# Patient Record
Sex: MALE | Race: WHITE
[De-identification: names, ages, dates, MRNs, and addresses within clinical notes are randomized per-mention and may not be internally consistent; named-entity substitution may affect disease eponyms.]

---

## 2018-11-24 ENCOUNTER — HOSPITAL ENCOUNTER (EMERGENCY)
Dept: HOSPITAL 57 - BURERS | Age: 28
Discharge: ANOTHER HOSPITAL | End: 2018-11-24
Payer: SELF-PAY

## 2018-11-24 DIAGNOSIS — F17.210: ICD-10-CM

## 2018-11-24 DIAGNOSIS — K35.32: Primary | ICD-10-CM

## 2018-11-24 LAB
ALBUMIN SERPL BCG-MCNC: 4.2 G/DL (ref 3.5–5)
ALP SERPL-CCNC: 47 U/L (ref 40–150)
ALT SERPL W P-5'-P-CCNC: 47 U/L (ref 8–55)
ANION GAP SERPL CALC-SCNC: 13 MMOL/L (ref 10–20)
AST SERPL-CCNC: 22 U/L (ref 5–34)
BASOPHILS # BLD AUTO: 0.1 THOU/UL (ref 0–0.2)
BASOPHILS NFR BLD AUTO: 0.5 % (ref 0–1)
BILIRUB SERPL-MCNC: 0.6 MG/DL (ref 0.2–1.2)
BUN SERPL-MCNC: 8 MG/DL (ref 8.9–20.6)
CALCIUM SERPL-MCNC: 9.8 MG/DL (ref 7.8–10.44)
CHLORIDE SERPL-SCNC: 99 MMOL/L (ref 98–107)
CO2 SERPL-SCNC: 28 MMOL/L (ref 22–29)
CREAT CL PREDICTED SERPL C-G-VRATE: 0 ML/MIN (ref 70–130)
EOSINOPHIL # BLD AUTO: 0.1 THOU/UL (ref 0–0.7)
EOSINOPHIL NFR BLD AUTO: 0.7 % (ref 0–10)
GLOBULIN SER CALC-MCNC: 3.3 G/DL (ref 2.4–3.5)
GLUCOSE SERPL-MCNC: 84 MG/DL (ref 70–105)
HGB BLD-MCNC: 14.4 G/DL (ref 14–18)
LIPASE SERPL-CCNC: 9 U/L (ref 8–78)
LYMPHOCYTES # BLD AUTO: 2.5 THOU/UL (ref 1.2–3.4)
LYMPHOCYTES NFR BLD AUTO: 13.5 % (ref 21–51)
MANUAL DIFF??: NO
MCH RBC QN AUTO: 30.2 PG (ref 27–31)
MCV RBC AUTO: 84.4 FL (ref 78–98)
MDIFF COMPLETE?: YES
MONOCYTES # BLD AUTO: 1.5 THOU/UL (ref 0.11–0.59)
MONOCYTES NFR BLD AUTO: 8.3 % (ref 0–10)
NEUTROPHILS # BLD AUTO: 14 THOU/UL (ref 1.4–6.5)
NEUTROPHILS NFR BLD AUTO: 77 % (ref 42–75)
PLATELET # BLD AUTO: 223 THOU/UL (ref 130–400)
POTASSIUM SERPL-SCNC: 4 MMOL/L (ref 3.5–5.1)
RBC # BLD AUTO: 4.77 MILL/UL (ref 4.7–6.1)
RBC UR QL AUTO: (no result) HPF (ref 0–3)
SODIUM SERPL-SCNC: 136 MMOL/L (ref 136–145)
SP GR UR STRIP: 1.01 (ref 1–1.03)
UNIDENT CRYS #/AREA URNS HPF: (no result) HPF
WBC # BLD AUTO: 18.2 THOU/UL (ref 4.8–10.8)
WBC UR QL AUTO: (no result) HPF (ref 0–3)

## 2018-11-24 PROCEDURE — 81015 MICROSCOPIC EXAM OF URINE: CPT

## 2018-11-24 PROCEDURE — 80053 COMPREHEN METABOLIC PANEL: CPT

## 2018-11-24 PROCEDURE — 83690 ASSAY OF LIPASE: CPT

## 2018-11-24 PROCEDURE — 81003 URINALYSIS AUTO W/O SCOPE: CPT

## 2018-11-24 PROCEDURE — 85025 COMPLETE CBC W/AUTO DIFF WBC: CPT

## 2018-11-24 PROCEDURE — 36415 COLL VENOUS BLD VENIPUNCTURE: CPT

## 2018-11-24 PROCEDURE — 74176 CT ABD & PELVIS W/O CONTRAST: CPT

## 2018-11-24 PROCEDURE — 96372 THER/PROPH/DIAG INJ SC/IM: CPT

## 2018-11-24 NOTE — CT
ABDOMEN AND PELVIC CT SCAN WITH IV CONTRAST

11/24/18

 

HISTORY: 

28-year-old male with history of abdominal pain. 

 

The lung bases are clear. The visualized liver, gallbladder, pancreas, spleen, and adrenal glands are
 unremarkable. No renal calculus or acute  obstruction. There is extensive inflammatory change in t
he region of the appendix as well as marked abnormal wall thickening of the cecum and a long segment 
of the terminal ileum with consider surrounding fat stranding as well as some minimal free fluid in t
he right colonic gutter and in the right pelvis. There is a punctate calcific focus within this area 
of abnormal fat stranding. I favor this to be consistent with that of acute appendicitis with rupture
 with associated phlegmon and resultant secondary inflammation and wall thickening of the terminal il
eum and cecum, there is some associated enlarged lymph nodes in the right lower quadrant as well. The
 free in the pelvis appears to be  more dense than typical ascitic fluid and certainly could be consi
stent with exudative fluid. No renal calculus or  obstruction. 

 

IMPRESSION:  

Evidence for ruptured acute appendicitis with extensive inflammatory mass/phlegmon in the region of t
he appendix with marked abnormal wall thickening of the terminal ileum and cecum probably secondary t
o the ruptured appendix phlegmon and inflammatory mass. 

 

Findings were discussed with Dr. Redding in the ER at 6:30 p.m..

 

There is no evidence for a percutaneously drainable abscess. No other significant acute process.

 

POS: [facility]